# Patient Record
Sex: FEMALE | Race: WHITE | NOT HISPANIC OR LATINO
[De-identification: names, ages, dates, MRNs, and addresses within clinical notes are randomized per-mention and may not be internally consistent; named-entity substitution may affect disease eponyms.]

---

## 2019-08-01 PROBLEM — Z00.00 ENCOUNTER FOR PREVENTIVE HEALTH EXAMINATION: Status: ACTIVE | Noted: 2019-08-01

## 2019-08-07 ENCOUNTER — APPOINTMENT (OUTPATIENT)
Dept: PULMONOLOGY | Facility: CLINIC | Age: 84
End: 2019-08-07
Payer: MEDICARE

## 2019-08-07 VITALS
HEART RATE: 64 BPM | WEIGHT: 135 LBS | DIASTOLIC BLOOD PRESSURE: 70 MMHG | SYSTOLIC BLOOD PRESSURE: 130 MMHG | HEIGHT: 60 IN | BODY MASS INDEX: 26.5 KG/M2 | TEMPERATURE: 97.7 F | OXYGEN SATURATION: 90 %

## 2019-08-07 DIAGNOSIS — E03.9 HYPOTHYROIDISM, UNSPECIFIED: ICD-10-CM

## 2019-08-07 DIAGNOSIS — Z85.3 PERSONAL HISTORY OF MALIGNANT NEOPLASM OF BREAST: ICD-10-CM

## 2019-08-07 DIAGNOSIS — I48.2 CHRONIC ATRIAL FIBRILLATION: ICD-10-CM

## 2019-08-07 DIAGNOSIS — Z78.9 OTHER SPECIFIED HEALTH STATUS: ICD-10-CM

## 2019-08-07 DIAGNOSIS — E78.5 HYPERLIPIDEMIA, UNSPECIFIED: ICD-10-CM

## 2019-08-07 DIAGNOSIS — I10 ESSENTIAL (PRIMARY) HYPERTENSION: ICD-10-CM

## 2019-08-07 PROCEDURE — 99204 OFFICE O/P NEW MOD 45 MIN: CPT | Mod: 25

## 2019-08-07 PROCEDURE — 76604 US EXAM CHEST: CPT

## 2019-08-07 RX ORDER — METOPROLOL SUCCINATE 50 MG/1
50 TABLET, EXTENDED RELEASE ORAL
Refills: 11 | Status: ACTIVE | COMMUNITY
Start: 2019-08-07

## 2019-08-07 RX ORDER — POTASSIUM CHLORIDE 1500 MG/1
20 TABLET, EXTENDED RELEASE ORAL
Refills: 0 | Status: ACTIVE | COMMUNITY
Start: 2019-08-07

## 2019-08-07 RX ORDER — LEVOTHYROXINE SODIUM 20 UG/ML
100 INJECTION, SOLUTION INTRAVENOUS
Refills: 0 | Status: ACTIVE | COMMUNITY
Start: 2019-08-07

## 2019-08-07 RX ORDER — TAMOXIFEN CITRATE 20 MG/1
20 TABLET, FILM COATED ORAL
Refills: 0 | Status: ACTIVE | COMMUNITY
Start: 2019-08-07

## 2019-08-07 RX ORDER — APIXABAN 2.5 MG/1
2.5 TABLET, FILM COATED ORAL
Refills: 0 | Status: ACTIVE | COMMUNITY
Start: 2019-08-07

## 2019-08-07 RX ORDER — FUROSEMIDE 20 MG/1
20 TABLET ORAL
Refills: 0 | Status: ACTIVE | COMMUNITY
Start: 2019-08-07

## 2019-08-07 RX ORDER — ROSUVASTATIN CALCIUM 5 MG/1
5 TABLET, FILM COATED ORAL
Refills: 0 | Status: ACTIVE | COMMUNITY
Start: 2019-08-07

## 2019-08-08 NOTE — ASSESSMENT
[FreeTextEntry1] : 89 year old female with left sided pleural effusion in the setting of new worsening lower ext edema but also with distant history of breast Ca. \par \par Effusion is most likely related to cardiac etiology. She has A-fib and new worsening lower ext edema. There is however a concern that this effusion may be due to recurrence in breast Ca or a new malignancy. \par \par Given advanced age, frailty, multiple comorbidities and that she is on anticoagulation, would consider thoracentesis a higher risk procedure than average for her. \par \par Plan:\par - Would favor trial of dieretic prior to considering diagnostic/theraputic thoracentesis. \par Will discuss with primary physician regarding change in lasix dose. \par \par RTC after discussion with primary physician and trial of dieresis.

## 2019-08-08 NOTE — PROCEDURE
[FreeTextEntry1] : Bedside US performed and images obtained and saved. \par \par Left side with large pleural effusion and atelectasis of lung. A-line pattern otherwise. \par Right side with B-line pattern up to mid lung fields.

## 2019-08-08 NOTE — HISTORY OF PRESENT ILLNESS
[Stable] : are stable [Difficulty Breathing During Exertion] : denies dyspnea on exertion [Feelings Of Weakness On Exertion] : stable exercise intolerance [Cough] : denies coughing [Wheezing] : stable wheezing [Regional Soft Tissue Swelling Both Lower Extremities] : worsened lower extremity edema [Chest Pain Or Discomfort] : denies chest pain [Fever] : denies fever [Oxygen] : the patient uses supplemental oxygen [FreeTextEntry1] : 89 year old female with pmh of Afib on anticoagulation, no documented history of CHF but is on lasix and remote history of breast Ca referred to us for evaluation for pleural effusion. Patient's mobility is limited and she uses a wheelchair. Family noted that she is wheezing when she moves around and appears more winded than usual. She was also recently prescribed home oxygen as well. \par Family also noted increasingly edematous lower ext. Denies cough, denies fevers or chest pain. \par \par

## 2019-09-19 ENCOUNTER — LABORATORY RESULT (OUTPATIENT)
Age: 84
End: 2019-09-19

## 2019-09-19 ENCOUNTER — APPOINTMENT (OUTPATIENT)
Dept: PULMONOLOGY | Facility: CLINIC | Age: 84
End: 2019-09-19
Payer: MEDICARE

## 2019-09-19 VITALS
TEMPERATURE: 97.9 F | OXYGEN SATURATION: 93 % | HEART RATE: 61 BPM | WEIGHT: 135 LBS | HEIGHT: 60 IN | DIASTOLIC BLOOD PRESSURE: 84 MMHG | BODY MASS INDEX: 26.5 KG/M2 | SYSTOLIC BLOOD PRESSURE: 122 MMHG

## 2019-09-19 PROCEDURE — 76604 US EXAM CHEST: CPT

## 2019-09-19 PROCEDURE — 99214 OFFICE O/P EST MOD 30 MIN: CPT | Mod: 25

## 2019-09-19 NOTE — PHYSICAL EXAM
[General Appearance - Well Developed] : well developed [Normal Appearance] : normal appearance [Well Groomed] : well groomed [General Appearance - Well Nourished] : well nourished [No Deformities] : no deformities [General Appearance - In No Acute Distress] : no acute distress [Normal Conjunctiva] : the conjunctiva exhibited no abnormalities [Eyelids - No Xanthelasma] : the eyelids demonstrated no xanthelasmas [Normal Oropharynx] : normal oropharynx [Neck Appearance] : the appearance of the neck was normal [Neck Cervical Mass (___cm)] : no neck mass was observed [Jugular Venous Distention Increased] : there was no jugular-venous distention [Thyroid Diffuse Enlargement] : the thyroid was not enlarged [Thyroid Nodule] : there were no palpable thyroid nodules [Heart Rate And Rhythm] : heart rate and rhythm were normal [Heart Sounds] : normal S1 and S2 [Murmurs] : no murmurs present [Respiration, Rhythm And Depth] : normal respiratory rhythm and effort [Exaggerated Use Of Accessory Muscles For Inspiration] : no accessory muscle use [Auscultation Breath Sounds / Voice Sounds] : lungs were clear to auscultation bilaterally [FreeTextEntry1] : Reduced breath sounds on the right [Abdomen Soft] : soft [Abdomen Tenderness] : non-tender [Abdomen Mass (___ Cm)] : no abdominal mass palpated [Abnormal Walk] : normal gait [Gait - Sufficient For Exercise Testing] : the gait was sufficient for exercise testing [Nail Clubbing] : no clubbing of the fingernails [Cyanosis, Localized] : no localized cyanosis [Petechial Hemorrhages (___cm)] : no petechial hemorrhages [1+ Pitting] : 1+  pitting [Skin Color & Pigmentation] : normal skin color and pigmentation [] : no rash [No Venous Stasis] : no venous stasis [Skin Lesions] : no skin lesions [No Skin Ulcers] : no skin ulcer [No Xanthoma] : no  xanthoma was observed [Deep Tendon Reflexes (DTR)] : deep tendon reflexes were 2+ and symmetric [Sensation] : the sensory exam was normal to light touch and pinprick [No Focal Deficits] : no focal deficits [Oriented To Time, Place, And Person] : oriented to person, place, and time [Impaired Insight] : insight and judgment were intact [Affect] : the affect was normal

## 2019-09-19 NOTE — DISCUSSION/SUMMARY
[FreeTextEntry1] : Some of her chest revealed decreased in the right pleural effusion l but still right lower lobe atelectasis

## 2019-09-19 NOTE — HISTORY OF PRESENT ILLNESS
[Stable] : are stable [Difficulty Breathing During Exertion] : denies dyspnea on exertion [Feelings Of Weakness On Exertion] : improved exercise intolerance [Cough] : denies coughing [Wheezing] : improved wheezing [Regional Soft Tissue Swelling Both Lower Extremities] : improved lower extremity edema [Chest Pain Or Discomfort] : denies chest pain [Fever] : denies fever [Oxygen] : the patient uses supplemental oxygen [4  -  Somewhat severe] : 4, somewhat severe [None] : None [Adherent] : the patient is adherent with ~his/her~ medication regimen [Side Effects] : ~He/She~ denies medication side effects [Goals--Doing Well] : the patient is doing well with ~his/her~ goals [FreeTextEntry1] : \par \par Taking the Lasix. The leg is less swollen. She still gets short of breath when she moves around. Which is comfortable at rest

## 2019-09-19 NOTE — ASSESSMENT
[FreeTextEntry1] : 89 year old female with left sided pleural effusion in the setting of new worsening lower ext edema but also with distant history of breast Ca. \par \par Effusion is most likely related to cardiac etiology but can not rule out related to breast cancer. She has A-fib and new worsening lower ext edema which improved with lasix. There is however a concern that this effusion may be due to recurrence in breast Ca or a new malignancy. \par \par Given advanced age, frailty, multiple comorbidities and that she is on anticoagulation, would consider thoracentesis a higher risk procedure than average for her.   I explained the risk including but not limited to PTX and hemothorax.\par \par Plan:\par - Continue lasiix and follow in 2 weeks as she improved\par - Hold apixaban 2 days prior the appointment\par - Will discuss with her cardiology.  I instructed the patient to let the cardiologist call me on my cell\par - Follow on platelet count\par I discussed with OPMD

## 2019-09-21 LAB
BASOPHILS # BLD AUTO: 0.09 K/UL
BASOPHILS NFR BLD AUTO: 1.3 %
EOSINOPHIL # BLD AUTO: 0.06 K/UL
EOSINOPHIL NFR BLD AUTO: 0.8 %
HCT VFR BLD CALC: 35.5 %
HGB BLD-MCNC: 10.3 G/DL
IMM GRANULOCYTES NFR BLD AUTO: 0.1 %
LYMPHOCYTES # BLD AUTO: 0.68 K/UL
LYMPHOCYTES NFR BLD AUTO: 9.5 %
MAN DIFF?: NORMAL
MCHC RBC-ENTMCNC: 23.4 PG
MCHC RBC-ENTMCNC: 29 GM/DL
MCV RBC AUTO: 80.7 FL
MONOCYTES # BLD AUTO: 1.01 K/UL
MONOCYTES NFR BLD AUTO: 14.1 %
NEUTROPHILS # BLD AUTO: 5.32 K/UL
NEUTROPHILS NFR BLD AUTO: 74.2 %
PLATELET # BLD AUTO: 219 K/UL
RBC # BLD: 4.4 M/UL
RBC # FLD: 20.2 %
WBC # FLD AUTO: 7.17 K/UL

## 2019-10-15 ENCOUNTER — FORM ENCOUNTER (OUTPATIENT)
Age: 84
End: 2019-10-15

## 2019-10-16 ENCOUNTER — OUTPATIENT (OUTPATIENT)
Dept: OUTPATIENT SERVICES | Facility: HOSPITAL | Age: 84
LOS: 1 days | End: 2019-10-16
Payer: MEDICARE

## 2019-10-16 ENCOUNTER — APPOINTMENT (OUTPATIENT)
Dept: PULMONOLOGY | Facility: CLINIC | Age: 84
End: 2019-10-16
Payer: MEDICARE

## 2019-10-16 VITALS
HEART RATE: 59 BPM | HEIGHT: 61 IN | SYSTOLIC BLOOD PRESSURE: 100 MMHG | OXYGEN SATURATION: 89 % | DIASTOLIC BLOOD PRESSURE: 60 MMHG

## 2019-10-16 LAB
ALBUMIN FLD-MCNC: 0.7 G/DL
B PERT IGG+IGM PNL SER: NORMAL
COLOR FLD: YELLOW
FLUID INTAKE SUBSTANCE CLASS: NORMAL
GLUCOSE FLD-MCNC: 104 MG/DL
LDH FLD-CCNC: 70 U/L
LYMPHOCYTES # FLD MANUAL: 44 %
MONOS+MACROS NFR FLD MANUAL: 23 %
NEUTS SEG # FLD MANUAL: 33 %
PH FLD: 7.68
PROT FLD-MCNC: 1.7 G/DL
RBC # FLD MANUAL: 0 /UL
TOTAL CELLS COUNTED FLD: 157 /UL
TUBE TYPE: NORMAL
URATE AMN-MCNC: NORMAL

## 2019-10-16 PROCEDURE — 76604 US EXAM CHEST: CPT

## 2019-10-16 PROCEDURE — 32555Z: CUSTOM

## 2019-10-16 PROCEDURE — 99215 OFFICE O/P EST HI 40 MIN: CPT | Mod: 25

## 2019-10-16 PROCEDURE — 71046 X-RAY EXAM CHEST 2 VIEWS: CPT | Mod: 26

## 2019-10-16 PROCEDURE — 71046 X-RAY EXAM CHEST 2 VIEWS: CPT

## 2019-10-16 NOTE — PROCEDURE
[FreeTextEntry1] : Bedside US performed and images obtained and saved. \par \par Left side with large pleural effusion and atelectasis of lung\par Procedure\par \par Thoracentesis\par \par Indication diagnostic and therapeutic\par \par Site left\par \par I explained to the daughter and the benefits and the risk. The benefits include diagnoses and therapeutics intervention. The risks include, but not limited to hemothorax pneumothorax infection. The daughter consented for the procedure. The patient is off anticoagulation for 3 days.\par \par Under ultrasound guidance, with  local anesthesia, sterilization x3.\par \par The procedure was performed and drained 950 cc which sent for analysis.\par \par Ultrasound post procedure reveals small pleural effusion residual and no pneumothorax.\par \par The patient was monitored during the procedure. Patient required 1 L of oxygen and was procedure most likely related to reexpansion of the left lower lobe for about 10 minutes

## 2019-10-16 NOTE — HISTORY OF PRESENT ILLNESS
[Stable] : are stable [Difficulty Breathing During Exertion] : denies dyspnea on exertion [Feelings Of Weakness On Exertion] : improved exercise intolerance [Cough] : denies coughing [Wheezing] : improved wheezing [Regional Soft Tissue Swelling Both Lower Extremities] : improved lower extremity edema [Chest Pain Or Discomfort] : denies chest pain [Fever] : denies fever [Oxygen] : the patient uses supplemental oxygen [4  -  Somewhat severe] : 4, somewhat severe [None] : None [Adherent] : the patient is adherent with ~his/her~ medication regimen [Side Effects] : ~He/She~ denies medication side effects [Goals--Doing Well] : the patient is doing well with ~his/her~ goals [FreeTextEntry1] : \par \par Taking the Lasix. The leg is less swollen. She still gets short of breath when she moves around. Which is comfortable at rest.  The lasix was incraesed.  She is occasionally coughing.  She sleeps with the oxygen

## 2019-10-16 NOTE — ASSESSMENT
[FreeTextEntry1] : 89 year old female with left sided pleural effusion in the setting of new worsening lower ext edema but also with distant history of breast Ca. \par \par Effusion is most likely related to cardiac etiology but can not rule out related to breast cancer. The procedure was was performed and the patient improved post procedure. The chest x-ray postprocedure revealed no pneumothorax.\par \par the fluid sent for analysis I will follow in the pleural fluid analysis including cytology. I discussed the case with the daughter and informed her that the fluid can recur and will follow in one month

## 2019-10-17 LAB — CHOLEST FLD-MCNC: 13 MG/DL

## 2019-10-19 LAB — OTHER FLUID CYTOLOGY: NORMAL

## 2019-12-05 ENCOUNTER — APPOINTMENT (OUTPATIENT)
Dept: PULMONOLOGY | Facility: CLINIC | Age: 84
End: 2019-12-05
Payer: MEDICARE

## 2019-12-05 VITALS
HEART RATE: 58 BPM | BODY MASS INDEX: 25.49 KG/M2 | HEIGHT: 61 IN | SYSTOLIC BLOOD PRESSURE: 100 MMHG | WEIGHT: 135 LBS | OXYGEN SATURATION: 98 % | DIASTOLIC BLOOD PRESSURE: 70 MMHG

## 2019-12-05 PROCEDURE — 99214 OFFICE O/P EST MOD 30 MIN: CPT | Mod: 25

## 2019-12-05 PROCEDURE — 76604 US EXAM CHEST: CPT

## 2019-12-05 NOTE — PHYSICAL EXAM
[General Appearance - Well Developed] : well developed [General Appearance - Well Nourished] : well nourished [No Deformities] : no deformities [Normal Appearance] : normal appearance [Well Groomed] : well groomed [Eyelids - No Xanthelasma] : the eyelids demonstrated no xanthelasmas [General Appearance - In No Acute Distress] : no acute distress [Normal Conjunctiva] : the conjunctiva exhibited no abnormalities [Neck Appearance] : the appearance of the neck was normal [Normal Oropharynx] : normal oropharynx [Neck Cervical Mass (___cm)] : no neck mass was observed [Thyroid Nodule] : there were no palpable thyroid nodules [Jugular Venous Distention Increased] : there was no jugular-venous distention [Thyroid Diffuse Enlargement] : the thyroid was not enlarged [Heart Rate And Rhythm] : heart rate and rhythm were normal [Murmurs] : no murmurs present [Heart Sounds] : normal S1 and S2 [Exaggerated Use Of Accessory Muscles For Inspiration] : no accessory muscle use [Respiration, Rhythm And Depth] : normal respiratory rhythm and effort [Auscultation Breath Sounds / Voice Sounds] : lungs were clear to auscultation bilaterally [Abdomen Soft] : soft [Abdomen Tenderness] : non-tender [Abdomen Mass (___ Cm)] : no abdominal mass palpated [Nail Clubbing] : no clubbing of the fingernails [Abnormal Walk] : normal gait [Gait - Sufficient For Exercise Testing] : the gait was sufficient for exercise testing [Petechial Hemorrhages (___cm)] : no petechial hemorrhages [Cyanosis, Localized] : no localized cyanosis [1+ Pitting] : 1+  pitting [Skin Color & Pigmentation] : normal skin color and pigmentation [] : no rash [Skin Lesions] : no skin lesions [No Skin Ulcers] : no skin ulcer [No Venous Stasis] : no venous stasis [No Xanthoma] : no  xanthoma was observed [Deep Tendon Reflexes (DTR)] : deep tendon reflexes were 2+ and symmetric [Sensation] : the sensory exam was normal to light touch and pinprick [Oriented To Time, Place, And Person] : oriented to person, place, and time [No Focal Deficits] : no focal deficits [Affect] : the affect was normal [Impaired Insight] : insight and judgment were intact [FreeTextEntry1] : Reduced breath sounds on the right

## 2019-12-05 NOTE — ASSESSMENT
[FreeTextEntry1] : 89 year old female with left sided pleural effusion in the setting of new worsening lower ext edema but also with distant history of breast Ca. \par \par the cytology revealed atypical cells.  US revealed reaccumulation of pleural fluid\par patient is asymptomatic\par follow in one month and might need drainage\par she is still on diuretics\par I discussed Pleurax with the daughter

## 2020-01-16 ENCOUNTER — APPOINTMENT (OUTPATIENT)
Dept: PULMONOLOGY | Facility: CLINIC | Age: 85
End: 2020-01-16
Payer: MEDICARE

## 2020-01-16 VITALS
TEMPERATURE: 97.5 F | HEART RATE: 58 BPM | WEIGHT: 135 LBS | OXYGEN SATURATION: 95 % | DIASTOLIC BLOOD PRESSURE: 58 MMHG | HEIGHT: 61 IN | BODY MASS INDEX: 25.49 KG/M2 | SYSTOLIC BLOOD PRESSURE: 98 MMHG

## 2020-01-16 PROCEDURE — 76604 US EXAM CHEST: CPT

## 2020-01-16 PROCEDURE — 99214 OFFICE O/P EST MOD 30 MIN: CPT | Mod: 25

## 2020-01-17 NOTE — PHYSICAL EXAM
[General Appearance - Well Developed] : well developed [Normal Appearance] : normal appearance [Well Groomed] : well groomed [General Appearance - Well Nourished] : well nourished [No Deformities] : no deformities [General Appearance - In No Acute Distress] : no acute distress [Normal Conjunctiva] : the conjunctiva exhibited no abnormalities [Eyelids - No Xanthelasma] : the eyelids demonstrated no xanthelasmas [Normal Oropharynx] : normal oropharynx [Neck Appearance] : the appearance of the neck was normal [Neck Cervical Mass (___cm)] : no neck mass was observed [Jugular Venous Distention Increased] : there was no jugular-venous distention [Thyroid Diffuse Enlargement] : the thyroid was not enlarged [Thyroid Nodule] : there were no palpable thyroid nodules [Heart Rate And Rhythm] : heart rate and rhythm were normal [Murmurs] : no murmurs present [Heart Sounds] : normal S1 and S2 [Respiration, Rhythm And Depth] : normal respiratory rhythm and effort [Exaggerated Use Of Accessory Muscles For Inspiration] : no accessory muscle use [Auscultation Breath Sounds / Voice Sounds] : lungs were clear to auscultation bilaterally [Abdomen Soft] : soft [Abdomen Tenderness] : non-tender [Abdomen Mass (___ Cm)] : no abdominal mass palpated [Abnormal Walk] : normal gait [Gait - Sufficient For Exercise Testing] : the gait was sufficient for exercise testing [Nail Clubbing] : no clubbing of the fingernails [Cyanosis, Localized] : no localized cyanosis [Petechial Hemorrhages (___cm)] : no petechial hemorrhages [1+ Pitting] : 1+  pitting [Skin Color & Pigmentation] : normal skin color and pigmentation [] : no rash [No Venous Stasis] : no venous stasis [Skin Lesions] : no skin lesions [No Xanthoma] : no  xanthoma was observed [No Skin Ulcers] : no skin ulcer [Deep Tendon Reflexes (DTR)] : deep tendon reflexes were 2+ and symmetric [Sensation] : the sensory exam was normal to light touch and pinprick [No Focal Deficits] : no focal deficits [Oriented To Time, Place, And Person] : oriented to person, place, and time [Affect] : the affect was normal [Impaired Insight] : insight and judgment were intact [FreeTextEntry1] : Reduced breath sounds on the right

## 2020-01-17 NOTE — ASSESSMENT
[FreeTextEntry1] : 89 year old female with left sided pleural effusion in the setting of stable lower ext edema but also with distant history of breast Ca. \par \par the cytology revealed atypical cells.  US revealed reaccumulation of pleural fluid\par patient is asymptomatic\par follow in one month and might need drainage.  Daughter was instructed to hold anticoagulation 2 days prior to coming due to potential for drainage of the pleural fluid.\par she is to continue on diuretics\par

## 2020-01-17 NOTE — PROCEDURE
[FreeTextEntry1] : US performed today during visit revealed small to moderate left Pleural effusion and right none

## 2020-01-17 NOTE — HISTORY OF PRESENT ILLNESS
[Stable] : are stable [Difficulty Breathing During Exertion] : denies dyspnea on exertion [Feelings Of Weakness On Exertion] : denies exercise intolerance [Cough] : denies coughing [Wheezing] : denies wheezing [Chest Pain Or Discomfort] : denies chest pain [Regional Soft Tissue Swelling Both Lower Extremities] : denies lower extremity edema [Fever] : denies fever [Oxygen] : the patient uses supplemental oxygen [PRN] : as needed [4  -  Somewhat severe] : 4, somewhat severe [None] : None [Adherent] : the patient is adherent with ~his/her~ medication regimen [Goals--Doing Well] : the patient is doing well with ~his/her~ goals [Side Effects] : ~He/She~ denies medication side effects [Wt Gain ___ Lbs] : no recent weight gain [Wt Loss ___ Lbs] : no recent weight loss [FreeTextEntry1] : Pt is doing well.  she is taking lasix and legs are less swollen.  She has limited activity but without complaints of cough or SOB.  She sleeps with the oxygen

## 2020-02-27 ENCOUNTER — APPOINTMENT (OUTPATIENT)
Dept: PULMONOLOGY | Facility: CLINIC | Age: 85
End: 2020-02-27
Payer: MEDICARE

## 2020-02-27 VITALS
DIASTOLIC BLOOD PRESSURE: 70 MMHG | BODY MASS INDEX: 25.49 KG/M2 | HEIGHT: 61 IN | WEIGHT: 135 LBS | SYSTOLIC BLOOD PRESSURE: 120 MMHG | OXYGEN SATURATION: 94 % | TEMPERATURE: 97.5 F | HEART RATE: 59 BPM

## 2020-02-27 PROCEDURE — 99214 OFFICE O/P EST MOD 30 MIN: CPT | Mod: 25

## 2020-02-27 PROCEDURE — 76604 US EXAM CHEST: CPT

## 2020-02-27 NOTE — ASSESSMENT
[FreeTextEntry1] : 89 year old female with left sided pleural effusion in the setting of stable lower ext edema but also with distant history of breast Ca. \par \par the cytology revealed atypical cells.  US revealed reaccumulation of pleural fluid but has not changed from the previous one\par patient is asymptomatic\par follow in one month and might need drainage.  Daughter was instructed to hold anticoagulation 2 days prior to coming due to potential for drainage of the pleural fluid.\par she is to continue on diuretics\par I dicsussed with the daughter that the effusion is most likely related top the previous history of breast cancer and will observe for now

## 2020-02-27 NOTE — HISTORY OF PRESENT ILLNESS
[TextBox_4] : she is doing weell.  She is not SOB.  She is coughing occasionally.  legs are about the same.

## 2020-02-27 NOTE — PROCEDURE
[FreeTextEntry1] : US of chest\par right multiple B lines and no effusion\par left moderate left effusion with LL atelectasis no change from previous one

## 2020-03-05 ENCOUNTER — APPOINTMENT (OUTPATIENT)
Dept: PULMONOLOGY | Facility: CLINIC | Age: 85
End: 2020-03-05

## 2020-04-23 ENCOUNTER — APPOINTMENT (OUTPATIENT)
Dept: PULMONOLOGY | Facility: CLINIC | Age: 85
End: 2020-04-23

## 2020-05-14 ENCOUNTER — APPOINTMENT (OUTPATIENT)
Dept: PULMONOLOGY | Facility: CLINIC | Age: 85
End: 2020-05-14

## 2020-07-23 ENCOUNTER — APPOINTMENT (OUTPATIENT)
Dept: PULMONOLOGY | Facility: CLINIC | Age: 85
End: 2020-07-23
Payer: MEDICARE

## 2020-07-23 VITALS
HEART RATE: 57 BPM | HEIGHT: 61 IN | DIASTOLIC BLOOD PRESSURE: 60 MMHG | BODY MASS INDEX: 25.49 KG/M2 | OXYGEN SATURATION: 93 % | TEMPERATURE: 97.8 F | WEIGHT: 135 LBS | SYSTOLIC BLOOD PRESSURE: 100 MMHG

## 2020-07-23 DIAGNOSIS — J98.11 ATELECTASIS: ICD-10-CM

## 2020-07-23 DIAGNOSIS — J90 PLEURAL EFFUSION, NOT ELSEWHERE CLASSIFIED: ICD-10-CM

## 2020-07-23 DIAGNOSIS — R60.0 LOCALIZED EDEMA: ICD-10-CM

## 2020-07-23 PROCEDURE — 76604 US EXAM CHEST: CPT

## 2020-07-23 PROCEDURE — 99214 OFFICE O/P EST MOD 30 MIN: CPT | Mod: 25

## 2020-07-23 NOTE — PHYSICAL EXAM
[No Acute Distress] : no acute distress [No Neck Mass] : no neck mass [Normal Appearance] : normal appearance [Normal Oropharynx] : normal oropharynx [Normal Rate/Rhythm] : normal rate/rhythm [Normal S1, S2] : normal s1, s2 [No Resp Distress] : no resp distress [No Murmurs] : no murmurs [No Abnormalities] : no abnormalities [Clear to Auscultation Bilaterally] : clear to auscultation bilaterally [Benign] : benign [Normal Gait] : normal gait [No Cyanosis] : no cyanosis [No Clubbing] : no clubbing [No Edema] : no edema [FROM] : FROM [Normal Color/ Pigmentation] : normal color/ pigmentation [No Focal Deficits] : no focal deficits [Normal Affect] : normal affect [Oriented x3] : oriented x3

## 2020-07-23 NOTE — ASSESSMENT
[FreeTextEntry1] : Pleural effusion dramatically decreased in size and are completely related to aggressive diuresis as lower extremity edema improved. Patient is to continue on Lasix and tamoxifen for breast cancer. He left lower lobe atelectasis improved. The pedal edema improved dramatically. No indication for thoracentesis at this point I will followup in 3 months. Baseline oxygen saturation improved and also her exercise capacity.

## 2020-07-23 NOTE — HISTORY OF PRESENT ILLNESS
[TextBox_4] : She is doing much better. The legs is not swallowing like before. She's taken the Lasix. Her oxygen saturation is improving. She is sleeping well and her appetite is good. She is not short of breath

## 2021-10-06 PROBLEM — I10 ESSENTIAL HYPERTENSION: Status: ACTIVE | Noted: 2019-08-07
